# Patient Record
Sex: MALE | Race: OTHER | Employment: UNEMPLOYED | URBAN - METROPOLITAN AREA
[De-identification: names, ages, dates, MRNs, and addresses within clinical notes are randomized per-mention and may not be internally consistent; named-entity substitution may affect disease eponyms.]

---

## 2022-01-01 ENCOUNTER — HOSPITAL ENCOUNTER (EMERGENCY)
Age: 27
End: 2022-08-22
Attending: STUDENT IN AN ORGANIZED HEALTH CARE EDUCATION/TRAINING PROGRAM

## 2022-01-01 DIAGNOSIS — I46.9 CARDIAC ARREST (HCC): Primary | ICD-10-CM

## 2022-01-01 PROCEDURE — 31500 INSERT EMERGENCY AIRWAY: CPT

## 2022-01-01 PROCEDURE — 74011250636 HC RX REV CODE- 250/636: Performed by: STUDENT IN AN ORGANIZED HEALTH CARE EDUCATION/TRAINING PROGRAM

## 2022-01-01 PROCEDURE — 92950 HEART/LUNG RESUSCITATION CPR: CPT

## 2022-01-01 PROCEDURE — 75810000304 HC PLACE NEED INTRAOSSEOUS INFUS

## 2022-01-01 PROCEDURE — 74011000250 HC RX REV CODE- 250: Performed by: STUDENT IN AN ORGANIZED HEALTH CARE EDUCATION/TRAINING PROGRAM

## 2022-01-01 PROCEDURE — 99285 EMERGENCY DEPT VISIT HI MDM: CPT

## 2022-01-01 RX ORDER — AMIODARONE HYDROCHLORIDE 150 MG/3ML
INJECTION, SOLUTION INTRAVENOUS
Status: COMPLETED | OUTPATIENT
Start: 2022-01-01 | End: 2022-01-01

## 2022-01-01 RX ORDER — SODIUM BICARBONATE 1 MEQ/ML
SYRINGE (ML) INTRAVENOUS
Status: DISCONTINUED
Start: 2022-01-01 | End: 2022-01-01 | Stop reason: HOSPADM

## 2022-01-01 RX ORDER — CALCIUM CHLORIDE INJECTION 100 MG/ML
INJECTION, SOLUTION INTRAVENOUS
Status: COMPLETED | OUTPATIENT
Start: 2022-01-01 | End: 2022-01-01

## 2022-01-01 RX ORDER — SODIUM BICARBONATE 84 MG/ML
INJECTION, SOLUTION INTRAVENOUS
Status: DISCONTINUED
Start: 2022-01-01 | End: 2022-01-01 | Stop reason: HOSPADM

## 2022-01-01 RX ORDER — SODIUM BICARBONATE 1 MEQ/ML
SYRINGE (ML) INTRAVENOUS
Status: COMPLETED | OUTPATIENT
Start: 2022-01-01 | End: 2022-01-01

## 2022-01-01 RX ORDER — EPINEPHRINE 0.1 MG/ML
INJECTION INTRACARDIAC; INTRAVENOUS
Status: COMPLETED | OUTPATIENT
Start: 2022-01-01 | End: 2022-01-01

## 2022-01-01 RX ADMIN — SODIUM BICARBONATE 50 MEQ: 84 INJECTION INTRAVENOUS at 01:18

## 2022-01-01 RX ADMIN — EPINEPHRINE 1 MG: 0.1 INJECTION, SOLUTION INTRAVENOUS at 01:17

## 2022-01-01 RX ADMIN — CALCIUM CHLORIDE 1 G: 100 INJECTION, SOLUTION INTRAVENOUS at 01:19

## 2022-01-01 RX ADMIN — SODIUM BICARBONATE 50 MEQ: 84 INJECTION INTRAVENOUS at 01:28

## 2022-01-01 RX ADMIN — EPINEPHRINE 1 MG: 0.1 INJECTION, SOLUTION INTRAVENOUS at 01:20

## 2022-01-01 RX ADMIN — AMIODARONE HYDROCHLORIDE 300 MG: 50 INJECTION, SOLUTION INTRAVENOUS at 01:26

## 2022-01-01 RX ADMIN — EPINEPHRINE 1 MG: 0.1 INJECTION, SOLUTION INTRAVENOUS at 01:23

## 2022-01-01 RX ADMIN — EPINEPHRINE 1 MG: 0.1 INJECTION, SOLUTION INTRAVENOUS at 01:26

## 2022-01-01 RX ADMIN — EPINEPHRINE 1 MG: 0.1 INJECTION, SOLUTION INTRAVENOUS at 01:29

## 2022-08-22 NOTE — ED NOTES
[facility] called about patient Officer Yayo Stapleton states he will either come to the department or call back for information

## 2022-08-22 NOTE — ED PROVIDER NOTES
[facility]  EMERGENCY DEPARTMENT ENCOUNTER NOTE    Date: 8/22/2022  Patient Name: David Dominguez    History of Presenting Illness     Chief Complaint   Patient presents with    Cardiac arrest     HPI: David Dominguez, 28 y.o. male with no known past medical history or medications presenting in cardiac arrest.    Patient presented to the ED resuscitation bay, pulseless, with ongoing CPR. Patient transferred to [facility] with ongoing CPR and ABCs promptly initiated. Pulse check on transfer confirmed absence of pulses. Collateral history was obtained from EMS. They reported that they were called to scene for an unresponsive individual.  On their arrival, the patient was pulseless and apneic. They initiated CPR, attempted intubation on the way, and arrived. No medications were given on route. On his arrival, CPR was continued. Abdomen was noted to be distended with an air leak and regurgitation through the tube, check of the tube showed esophageal intubation, was switched to a full intubation by anesthesia at bedside. Further history from EMS reported that the patient was reportedly coming into town to visit a friend. He took Christiano Royals pill\" of unknown medicine and was unresponsive after. This history is per EMS and I am not able to confirm this information. I discussed the case very briefly with family however they were extremely frantic, more than 20 people were outside the emergency department. History was very limited and I was unable to get additional history from the family as multiple of them had presyncopal vasovagal episodes and scene safety was of concern. Security present. Medical History   I reviewed the medical, surgical, family, and social history, as well as allergies:    PCP: None    Past Medical History:  History reviewed. No pertinent past medical history. Past Surgical History:  No past surgical history on file.   Current Outpatient Medications:  No current outpatient medications   Family History:  History reviewed. No pertinent family history. Social History: Allergies:  Not on File    Review of Systems     Unable to do ROS: Patient unresponsive. Physical Exam and Vital Signs   Vital Signs - Reviewed the patient's vital signs. No data found. Physical Exam:    GENERAL: high quality CPR in progress  AIRWAY: being ventilated via EMS ET tube on presentation however airway leak, abdominal distention, and gastric contents were noted upon patient's arrival.  The confirmed an esophageal intubation at that time, the patient was extubated, bagged, suctioned. Anesthesia at bedside on patient's arrival to manage her airway. They reported that the patient had a significant amount of what seems to be an \"chicken rice bowl\" in the posterior pharynx and around the vocal cords making intubation challenging however after suctioning, tube was finally secured and end-tidal CO2 confirmed tube placement. BREATHING: bilateral air entry present after intubation. CIRCULATION: CPR in progress, non-palpable pulses on presentation, access established.  ----------------------  HEENT:  * Head trauma not present  * GCS 3  CV:  * pulseless  PULMONARY: no respiratory effort  ABDOMEN: distended  : Normal appearing genitalia  EXTREMITIES: cold to touch. HD fistulas not present. SKIN: Wounds, lacerations, rashes, or needle track marks not present. NEURO:  * GCS 3  * Pupils equal and fixed at 3mm    Medical Decision Making   - I am the first and primary provider for this patient and am the primary provider of record. - I reviewed the vital signs, available nursing notes, past medical history, past surgical history, family history and social history. - Initial assessment performed. The patients presenting problems have been discussed, and the staff are in agreement with the care plan formulated and outlined with them.   I have encouraged them to ask questions as they arise throughout their visit. - Available medical records, nursing notes, old EKGs, and EMS run sheets (if patient was EMS transported) were reviewed    MDM:   Patient is presenting in cardiopulmonary arrest. Prehospital interventions as per HPI. No known DNR orders on the system identified. On presentation, CPR was transitioned from EMS team to ED team promptly and continued. High quality CPR was provided with adequate depth, rate, and frequency. Patient received from EMS. Airway managed by exchange esophageal tube with ET tube and confirmation of airway. Bilateral air entry noted. Access through IOx2 established and verified, parenteral fluids initiated. ACLS protocol initiated on presentation. CPR maintained throughout emergency department course between pulse checks. - Presenting rhythm in ED: asystole  - Rhythm during pulse checks in ED: asystole, one Vfib - shocked, then PEA, then asystole  - During CPR, the patient monitored via monitors, ETCO2  - Number of shocks delivered: 1  - Procedures done during resuscitation included: intubation, IOx2  - Total CPR time per nursing leader charting  - ROSC not achieved. With cardiac arrest of uncertain etiology, the list below shows the utilized medical decision making and differential (Hs & Ts):    - Hypovolemia: patient was given fluids through the established access. - Hypoxia: Airway secured, and ventilation and oxygenation were provided. - Acidosis: based on chart review for medical problems that may suggest primary acidosis like DKA or ESRD, bicarb was prophylactically given. - Hyperkalemia: Dialysis access not found to suggest potassium abnormality thus CaCl prophylactically given. - Hypothermia:  Temperature was normal.  - Hypoglycemia:  Glucose was normal.  - Cardiac tamponade: Clinical signs of effusion/tamponade were not present. - Tension pneumothorax:  Bilateral breath sounds heard on auscultation while investigating for possibility of pneumothorax.   - ACS/M: given lack of suggestive history and vague, TPA is unlikely to improve outcomes and therefore was not administered. - DVT/PE:  chart review, asymmetric LE swelling, and clinical signs are not found to suggest PE, TPA not administered. - Trauma:  Significant external trauma was not present to suggest that as a cause of arrest.  - Toxins: Signs of self-harm or findings suspicious of overdose were not present. ACLS medications and therapies administered in the emergency department are listed below. After the abovementioned interventions, ROSC was not achieved. Cardiac ultrasound was done and showed cardiac standstill with absence of cardiac activity, the patient was pronounced dead at 01:31. Patient shows no signs of life, is pulseless, and has no respiratory effort. Will attempt to contact and inform next of kin or guardian. Results     Labs:  No results found for this or any previous visit (from the past 12 hour(s)). Radiologic Studies:  CT Results  (Last 48 hours)      None          CXR Results  (Last 48 hours)      None          Medications ordered:  Medications   sodium bicarbonate (8.4%) 1 mEq/mL (8.4 %) injection (has no administration in time range)   sodium bicarbonate 8.4 % (1 mEq/mL) injection (has no administration in time range)   EPINEPHrine (ADRENALIN) 0.1 mg/mL syringe (1 mg IntraVENous Given 22 0129)   sodium bicarbonate 8.4 % (1 mEq/mL) injection (50 mEq IntraVENous Given 22 0128)   calcium chloride injection (1 g IntraVENous Given 22 0119)   amiodarone (CORDARONE) injection (300 mg IntraVENous Given 22 0126)     ED Course and Reassessment     ED Course:          Final Disposition     DISPOSITION:     ED Procedures   Performed by: Conner Wood MD  Procedures       PROCEDURE: High Quality CPR    Performed by: Conner Wood MD  Date: 2022    CPR was initiated after confirmation of absence of pulses. CPR was done uninterrupted except during pulse checks.   High quality CPR was provided with adequate depth and rate of 120cpm with full recoil. CPR quality was monitored by myself and feedback was given when indicated. CPR was done via manual compression. Ventilation was done during CPR after airway management as above. OTHER PROCEDURES:  PROCEDURE: IO Placement    Performed by: Rosana Smallwood MD  Date: 8/22/2022    - Indication: Access required, Multiple attempts at peripheral IV placement were made by the nursing staff without success. - Unable to obtain consent due to lack of capacity. No decision maker was available for discussion. As the benefits outweigh the risks, will proceed with the indicated procedure. An appropriate time out was taken documenting proper procedure, location, and patient. My hands were washed immediately prior to the procedure. - Procedure: The area was prepped in the usual fashion. IO was placed in the right and left tibial site using IO drill device. - IO catheter was confirmed by insuring stability, easy flush, and marrow/blood identification on aspiration.  - Patient tolerated procedure well without complications  - Estimated Blood Loss: none    ED Critical Care   and Critical Care  CRITICAL CARE NOTE :  2:07 AM    Critical care time is being documented due to the fulfillment of at least one of the following:    - Critical conditions: condition that acutely impairs one or more vital organ systems such that there is a high probability of imminent or life-threatening deterioration in condition. Examples are diagnoses including but not limited to Afib RVR, DKA, PE, Etc. .    - Critical interventions: an action whose failure to initiate would likely allow a sudden, clinically significant decline in the patient's condition.  These include  Requirement of transfer or ICU admission  Contemplation or provision of tPA  Drip initiation (pressors, antiarrhythmics, heparin, etc.)  Antidotes given (narcan, charcoal, epi for anaphylaxis, etc..)  >=2L fluid bolus  >=3 Duonebs  >1 IV/IM doses of sedatives, antiepileptics, BP meds, rate control meds, adenosine. Procedures that are suggestive of critical care: chest tubes, cardioversion, BiPAP, IO, etc..    Critical care time is documented based on continuous or non-continuous provision of care that includes face-to-face time, placing orders, chart review, documentation, discussion with consultants, discussion with family. This time calculation is a best approximation and does not include time spent on CPR, EKG interpretation, central line placement, intubation, laceration repairs, and other separately billed procedures. Amount of Critical Care Time: 35minutes    Details of critical care provision is documented above. A general summary is listed below:    IMPENDING DETERIORATION - Cardiovascular  ASSOCIATED RISK FACTORS - Cardiovascular Changes, Dysrhythmias  MANAGEMENT- Bedside Assessment  INTERPRETATION -  EKG, Monitor, ETCO2  INTERVENTIONS - Hemodynamic and Metabolic interventions  CASE DISCUSSION - EMS  TREATMENT RESPONSE - Unchanged  PERFORMED BY - Self    NOTES   :  During this entire length of time I was immediately available to the patient . Oni Perkins MD     Diagnosis     Clinical Impression:   1. Cardiac arrest [facility])      Attestations:    Oni Perkins MD    Please note that this dictation was completed with [employer], the computer voice recognition software. Quite often unanticipated grammatical, syntax, homophones, and other interpretive errors are inadvertently transcribed by the computer software. Please disregard these errors. Please excuse any errors that have escaped final proofreading. Thank you.

## 2022-08-22 NOTE — PROGRESS NOTES
Spiritual Care Assessment/Progress Note  [facility]      NAME: Brendan Baker      MRN: [id number]  AGE: 28 y.o.  SEX: male  Latter-day Affiliation: Religious   Language: English     8/22/2022     Total Time (in minutes): 146     Spiritual Assessment begun in [address] [place] [address] DEPT through conversation with:         [x]Patient        [] Family    [] Friend(s)        Reason for Consult: Code Blue/99, Family care     Spiritual beliefs: (Please include comment if needed)     [] Identifies with a ahmet tradition:         [] Supported by a ahmet community:            [] Claims no spiritual orientation:           [] Seeking spiritual identity:                [] Adheres to an individual form of spirituality:           [x] Not able to assess:                           Identified resources for coping:      [x] Prayer                               [] Music                  [] Guided Imagery     [x] Family/friends                 [] Pet visits     [] Devotional reading                         [] Unknown     [] Other:                                               Interventions offered during this visit: (See comments for more details)          Family/Friend(s): Bridging, Catharsis/review of pertinent events in supportive environment, Life review/legacy, Normalization of emotional/spiritual concerns     Plan of Care:     [x] Support spiritual and/or cultural needs    [] Support AMD and/or advance care planning process      [] Support grieving process   [] Coordinate Rites and/or Rituals    [] Coordination with community clergy   [] No spiritual needs identified at this time   [] Detailed Plan of Care below (See Comments)  [] Make referral to Music Therapy  [] Make referral to Pet Therapy     [] Make referral to Addiction services  [] Make referral to [facility]  [] Make referral to Spiritual Care Partner  [] No future visits requested        [x] Contact Spiritual Care for further referrals     Comments:  responded to page for EMS code blue.  was present upon arrival of patient. Wife was in the waiting room.  spoke with wife and was able to get patient name and date of birth. Wife share little about what her understanding of what happen this evening. Family started to arrive, large family present and support. Family share little about their [place] culture.  was present when Doctor talk with wife and patient brother. Once patient was clean up Doctor and  were able to get wife and one brother to come back and look though the glass to their loved one.  offered ministry of presence and support, hospitality with water and drinks. Advised nurse to contact [facility] for any further referrals.     [address], [facility]    Please [facility]  in order to get in touch with  for any Spiritual Care Needs   [phone]   OR   Reach out to us on [facility] at [facility].

## 2022-08-22 NOTE — ED TRIAGE NOTES
Per [facility]: pt found unresponsive; CODE BLUE with EMS; EMS states upon their arrival to the scene, patient found unresponsive with pulse; pt became bradycardic, then cardiac arrest at approx. 0045; 2mg nasal narcan given by EMS; no IV access; intubated by EMS      Patient's wife in waiting room;   spoke with her and gave a name and   Al Hines 1995    Patient's wife reports that the patient is visiting from out of town, and one of his friends gave him a pill; unknown pill; pt's wife states the patient had not been drinking tonight